# Patient Record
Sex: MALE
[De-identification: names, ages, dates, MRNs, and addresses within clinical notes are randomized per-mention and may not be internally consistent; named-entity substitution may affect disease eponyms.]

---

## 2018-02-06 ENCOUNTER — RX ONLY (OUTPATIENT)
Age: 28
Setting detail: RX ONLY
End: 2018-02-06

## 2021-04-28 ENCOUNTER — OFFICE VISIT (OUTPATIENT)
Dept: URBAN - METROPOLITAN AREA CLINIC 10 | Facility: CLINIC | Age: 31
End: 2021-04-28
Payer: COMMERCIAL

## 2021-04-28 DIAGNOSIS — H43.393 OTHER VITREOUS OPACITIES, BILATERAL: ICD-10-CM

## 2021-04-28 DIAGNOSIS — H47.293 OTHER OPTIC ATROPHY, BILATERAL: ICD-10-CM

## 2021-04-28 DIAGNOSIS — H04.123 TEAR FILM INSUFFICIENCY OF BILATERAL LACRIMAL GLANDS: Primary | ICD-10-CM

## 2021-04-28 PROCEDURE — 92134 CPTRZ OPH DX IMG PST SGM RTA: CPT | Performed by: OPTOMETRIST

## 2021-04-28 PROCEDURE — 99204 OFFICE O/P NEW MOD 45 MIN: CPT | Performed by: OPTOMETRIST

## 2021-04-28 ASSESSMENT — INTRAOCULAR PRESSURE
OS: 15
OD: 14

## 2021-04-28 ASSESSMENT — VISUAL ACUITY
OS: 20/50
OD: 20/40

## 2021-04-28 NOTE — IMPRESSION/PLAN
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 Hx nystagmus. Limits acuity. Notes reviewed. Trial Brimonidine BID OU in attempt to reduce dilation/glare. May Rx if pt desires. RTC 1 year CEE/HVF 30-2/OCT RNFL.

## 2021-04-28 NOTE — IMPRESSION/PLAN
Impression: Other vitreous opacities, bilateral: H43.393. Plan: There is no evidence of retinal pathology. All symptoms of retinal detachment or tears were discussed in detail. If pt. notices any symptoms discussed, contact office immediately. Recommend pt. return for normal recall.

## 2021-04-28 NOTE — IMPRESSION/PLAN
Impression: Tear film insufficiency of bilateral lacrimal glands: H04.123. Plan: Meibomian gland dysfunction and/or blepharitis with resulting dry eyes account for the patient's complaint. Recommend Omega 3s, artificial tears QID or PRN, warm compresses, and lid scrubs. RTC for normal recall, sooner with issues.

## 2022-08-05 ENCOUNTER — OFFICE VISIT (OUTPATIENT)
Dept: URBAN - METROPOLITAN AREA CLINIC 10 | Facility: CLINIC | Age: 32
End: 2022-08-05
Payer: COMMERCIAL

## 2022-08-05 DIAGNOSIS — H47.293 OTHER OPTIC ATROPHY, BILATERAL: ICD-10-CM

## 2022-08-05 DIAGNOSIS — H43.312 VITREOUS MEMBRANES AND STRANDS, LEFT EYE: Primary | ICD-10-CM

## 2022-08-05 PROCEDURE — 92134 CPTRZ OPH DX IMG PST SGM RTA: CPT | Performed by: OPTOMETRIST

## 2022-08-05 PROCEDURE — 99214 OFFICE O/P EST MOD 30 MIN: CPT | Performed by: OPTOMETRIST

## 2022-08-05 ASSESSMENT — INTRAOCULAR PRESSURE
OD: 11
OD: 10
OS: 6

## 2022-08-05 NOTE — IMPRESSION/PLAN
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 Hx of nystagmus. See above plan as well.
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
## 2024-04-26 ENCOUNTER — OFFICE VISIT (OUTPATIENT)
Dept: URBAN - METROPOLITAN AREA CLINIC 10 | Facility: CLINIC | Age: 34
End: 2024-04-26
Payer: COMMERCIAL

## 2024-04-26 DIAGNOSIS — H47.293 OTHER OPTIC ATROPHY, BILATERAL: Primary | ICD-10-CM

## 2024-04-26 DIAGNOSIS — H43.312 VITREOUS MEMBRANES AND STRANDS, LEFT EYE: ICD-10-CM

## 2024-04-26 PROCEDURE — 92133 CPTRZD OPH DX IMG PST SGM ON: CPT | Performed by: OPTOMETRIST

## 2024-04-26 PROCEDURE — 99214 OFFICE O/P EST MOD 30 MIN: CPT | Performed by: OPTOMETRIST

## 2024-04-26 PROCEDURE — 92134 CPTRZ OPH DX IMG PST SGM RTA: CPT | Performed by: OPTOMETRIST

## 2024-04-26 ASSESSMENT — INTRAOCULAR PRESSURE
OS: 12
OD: 13

## 2024-04-26 ASSESSMENT — VISUAL ACUITY
OD: 20/40
OS: 20/40

## 2024-04-26 ASSESSMENT — KERATOMETRY
OD: 43.50
OS: 43.25

## 2025-09-04 ENCOUNTER — APPOINTMENT (OUTPATIENT)
Dept: URBAN - METROPOLITAN AREA CLINIC 168 | Facility: CLINIC | Age: 35
Setting detail: DERMATOLOGY
End: 2025-09-04

## 2025-09-04 DIAGNOSIS — L71.8 OTHER ROSACEA: ICD-10-CM

## 2025-09-04 PROCEDURE — ? PRESCRIPTION MEDICATION MANAGEMENT

## 2025-09-04 PROCEDURE — ? PRESCRIPTION

## 2025-09-04 PROCEDURE — ? ADDITIONAL NOTES

## 2025-09-04 PROCEDURE — ? COUNSELING

## 2025-09-04 RX ORDER — DOXYCYCLINE HYCLATE 100 MG/1
CAPSULE, GELATIN COATED ORAL BID
Qty: 42 | Refills: 0 | Status: ERX | COMMUNITY
Start: 2025-09-04

## 2025-09-04 RX ORDER — IVERMECTIN 10 MG/G
CREAM TOPICAL
Qty: 45 | Refills: 2 | Status: ERX | COMMUNITY
Start: 2025-09-04

## 2025-09-04 RX ORDER — IVERMECTIN 10 MG/G
CREAM TOPICAL
Qty: 45 | Refills: 2 | Status: ERX

## 2025-09-04 RX ADMIN — IVERMECTIN: 10 CREAM TOPICAL at 00:00

## 2025-09-04 RX ADMIN — DOXYCYCLINE HYCLATE: 100 CAPSULE, GELATIN COATED ORAL at 00:00

## 2025-09-04 ASSESSMENT — LOCATION ZONE DERM: LOCATION ZONE: FACE

## 2025-09-04 ASSESSMENT — LOCATION DETAILED DESCRIPTION DERM
LOCATION DETAILED: RIGHT INFERIOR CENTRAL MALAR CHEEK
LOCATION DETAILED: LEFT CENTRAL MALAR CHEEK
LOCATION DETAILED: RIGHT CENTRAL MALAR CHEEK

## 2025-09-04 ASSESSMENT — LOCATION SIMPLE DESCRIPTION DERM
LOCATION SIMPLE: RIGHT CHEEK
LOCATION SIMPLE: LEFT CHEEK

## 2025-09-04 ASSESSMENT — SEVERITY ASSESSMENT OVERALL AMONG ALL PATIENTS
IN YOUR EXPERIENCE, AMONG ALL PATIENTS YOU HAVE SEEN WITH THIS CONDITION, HOW SEVERE IS THIS PATIENT'S CONDITION?: MILD TO MODERATE

## 2025-09-05 RX ORDER — IVERMECTIN 10 MG/G
CREAM TOPICAL
Qty: 45 | Refills: 2 | Status: ERX